# Patient Record
Sex: MALE | URBAN - METROPOLITAN AREA
[De-identification: names, ages, dates, MRNs, and addresses within clinical notes are randomized per-mention and may not be internally consistent; named-entity substitution may affect disease eponyms.]

---

## 2020-02-28 ENCOUNTER — APPOINTMENT (RX ONLY)
Dept: URBAN - METROPOLITAN AREA CLINIC 50 | Facility: CLINIC | Age: 25
Setting detail: DERMATOLOGY
End: 2020-02-28

## 2020-02-28 DIAGNOSIS — L29.8 OTHER PRURITUS: ICD-10-CM

## 2020-02-28 DIAGNOSIS — R21 RASH AND OTHER NONSPECIFIC SKIN ERUPTION: ICD-10-CM

## 2020-02-28 DIAGNOSIS — L29.89 OTHER PRURITUS: ICD-10-CM

## 2020-02-28 PROCEDURE — 11105 PUNCH BX SKIN EA SEP/ADDL: CPT

## 2020-02-28 PROCEDURE — ? BIOPSY BY PUNCH METHOD

## 2020-02-28 PROCEDURE — 11104 PUNCH BX SKIN SINGLE LESION: CPT

## 2020-02-28 PROCEDURE — 99202 OFFICE O/P NEW SF 15 MIN: CPT | Mod: 25

## 2020-02-28 PROCEDURE — ? COUNSELING

## 2020-02-28 PROCEDURE — ? PRESCRIPTION

## 2020-02-28 RX ORDER — TRIAMCINOLONE ACETONIDE 1 MG/G
CREAM TOPICAL
Qty: 1 | Refills: 5 | Status: ERX | COMMUNITY
Start: 2020-02-28

## 2020-02-28 RX ADMIN — TRIAMCINOLONE ACETONIDE: 1 CREAM TOPICAL at 00:00

## 2020-02-28 ASSESSMENT — LOCATION SIMPLE DESCRIPTION DERM
LOCATION SIMPLE: RIGHT FOREARM
LOCATION SIMPLE: ABDOMEN

## 2020-02-28 ASSESSMENT — LOCATION ZONE DERM
LOCATION ZONE: ARM
LOCATION ZONE: TRUNK

## 2020-02-28 ASSESSMENT — LOCATION DETAILED DESCRIPTION DERM
LOCATION DETAILED: EPIGASTRIC SKIN
LOCATION DETAILED: RIGHT VENTRAL DISTAL FOREARM

## 2020-03-12 ENCOUNTER — APPOINTMENT (RX ONLY)
Dept: URBAN - METROPOLITAN AREA CLINIC 50 | Facility: CLINIC | Age: 25
Setting detail: DERMATOLOGY
End: 2020-03-12

## 2020-03-12 DIAGNOSIS — Z48.02 ENCOUNTER FOR REMOVAL OF SUTURES: ICD-10-CM

## 2020-03-12 PROCEDURE — 99024 POSTOP FOLLOW-UP VISIT: CPT

## 2020-03-12 PROCEDURE — ? SUTURE REMOVAL (GLOBAL PERIOD)

## 2020-03-12 ASSESSMENT — LOCATION DETAILED DESCRIPTION DERM
LOCATION DETAILED: PERIUMBILICAL SKIN
LOCATION DETAILED: RIGHT VENTRAL DISTAL FOREARM

## 2020-03-12 ASSESSMENT — LOCATION ZONE DERM
LOCATION ZONE: TRUNK
LOCATION ZONE: ARM

## 2020-03-12 ASSESSMENT — LOCATION SIMPLE DESCRIPTION DERM
LOCATION SIMPLE: ABDOMEN
LOCATION SIMPLE: RIGHT FOREARM

## 2020-03-12 NOTE — PROCEDURE: SUTURE REMOVAL (GLOBAL PERIOD)
Detail Level: Detailed
Add 28221 Cpt? (Important Note: In 2017 The Use Of 48215 Is Being Tracked By Cms To Determine Future Global Period Reimbursement For Global Periods): yes

## 2020-04-09 ENCOUNTER — APPOINTMENT (RX ONLY)
Dept: URBAN - METROPOLITAN AREA CLINIC 50 | Facility: CLINIC | Age: 25
Setting detail: DERMATOLOGY
End: 2020-04-09

## 2020-04-09 DIAGNOSIS — L81.0 POSTINFLAMMATORY HYPERPIGMENTATION: ICD-10-CM

## 2020-04-09 DIAGNOSIS — L43.8 OTHER LICHEN PLANUS: ICD-10-CM | Status: RESOLVING

## 2020-04-09 PROCEDURE — ? ADDITIONAL NOTES

## 2020-04-09 PROCEDURE — ? ORDER TESTS

## 2020-04-09 PROCEDURE — ? PRESCRIPTION

## 2020-04-09 PROCEDURE — 99213 OFFICE O/P EST LOW 20 MIN: CPT

## 2020-04-09 PROCEDURE — ? COUNSELING

## 2020-04-09 RX ORDER — TRIAMCINOLONE ACETONIDE 1 MG/G
CREAM TOPICAL BID
Qty: 1 | Refills: 3 | Status: ERX

## 2020-04-09 ASSESSMENT — LOCATION SIMPLE DESCRIPTION DERM
LOCATION SIMPLE: LEFT FOREARM
LOCATION SIMPLE: RIGHT FOREARM
LOCATION SIMPLE: DORSAL PENIS
LOCATION SIMPLE: RIGHT FOREARM
LOCATION SIMPLE: LEFT FOREARM

## 2020-04-09 ASSESSMENT — LOCATION ZONE DERM
LOCATION ZONE: ARM
LOCATION ZONE: ARM
LOCATION ZONE: PENIS

## 2020-04-09 ASSESSMENT — LOCATION DETAILED DESCRIPTION DERM
LOCATION DETAILED: MID-DORSAL PENILE SHAFT
LOCATION DETAILED: RIGHT VENTRAL PROXIMAL FOREARM
LOCATION DETAILED: LEFT VENTRAL DISTAL FOREARM
LOCATION DETAILED: LEFT VENTRAL PROXIMAL FOREARM
LOCATION DETAILED: RIGHT VENTRAL DISTAL FOREARM
LOCATION DETAILED: RIGHT VENTRAL DISTAL FOREARM

## 2020-04-09 NOTE — HPI: DISCOLORATION (HYPERPIGMENTATION)
Is This A New Presentation, Or A Follow-Up?: Discoloration
How Severe Is It?: mild
Additional History: spots left behind after resolution of the rash

## 2020-04-09 NOTE — PROCEDURE: ORDER TESTS
Performing Laboratory: -[id number]
Billing Type: Third-Party Bill
Expected Date Of Service: 04/09/2020
Bill For Surgical Tray: no

## 2020-04-09 NOTE — PROCEDURE: COUNSELING
Detail Level: Detailed
Patient Specific Counseling (Will Not Stick From Patient To Patient): ok to use a small amount of TAC on the areas of PIH.  Explained that LP is a very inflammatory condition
Patient Specific Counseling (Will Not Stick From Patient To Patient): Discussed assoc with Hep C.  Patient is requesting lab testing.

## 2020-04-10 ENCOUNTER — APPOINTMENT (RX ONLY)
Dept: URBAN - METROPOLITAN AREA CLINIC 50 | Facility: CLINIC | Age: 25
Setting detail: DERMATOLOGY
End: 2020-04-10

## 2020-04-10 DIAGNOSIS — Z02.9 ENCOUNTER FOR ADMINISTRATIVE EXAMINATIONS, UNSPECIFIED: ICD-10-CM

## 2020-04-21 ENCOUNTER — APPOINTMENT (RX ONLY)
Dept: URBAN - METROPOLITAN AREA CLINIC 50 | Facility: CLINIC | Age: 25
Setting detail: DERMATOLOGY
End: 2020-04-21

## 2020-04-21 DIAGNOSIS — L43.8 OTHER LICHEN PLANUS: ICD-10-CM

## 2020-04-21 PROCEDURE — ? ORDER TESTS

## 2020-04-21 ASSESSMENT — LOCATION ZONE DERM: LOCATION ZONE: ARM

## 2020-04-21 ASSESSMENT — LOCATION DETAILED DESCRIPTION DERM
LOCATION DETAILED: RIGHT VENTRAL DISTAL FOREARM
LOCATION DETAILED: LEFT VENTRAL DISTAL FOREARM

## 2020-04-21 ASSESSMENT — LOCATION SIMPLE DESCRIPTION DERM
LOCATION SIMPLE: LEFT FOREARM
LOCATION SIMPLE: RIGHT FOREARM

## 2020-04-21 NOTE — PROCEDURE: ORDER TESTS
Bill For Surgical Tray: no
Performing Laboratory: -[id number]
Billing Type: Third-Party Bill
Expected Date Of Service: 04/09/2020